# Patient Record
Sex: MALE | Race: ASIAN | NOT HISPANIC OR LATINO
[De-identification: names, ages, dates, MRNs, and addresses within clinical notes are randomized per-mention and may not be internally consistent; named-entity substitution may affect disease eponyms.]

---

## 2023-05-05 ENCOUNTER — APPOINTMENT (OUTPATIENT)
Dept: UROLOGY | Facility: CLINIC | Age: 73
End: 2023-05-05
Payer: MEDICARE

## 2023-05-05 VITALS
TEMPERATURE: 98.2 F | HEART RATE: 78 BPM | DIASTOLIC BLOOD PRESSURE: 74 MMHG | OXYGEN SATURATION: 96 % | HEIGHT: 65 IN | SYSTOLIC BLOOD PRESSURE: 149 MMHG | BODY MASS INDEX: 21.16 KG/M2 | WEIGHT: 127 LBS | RESPIRATION RATE: 18 BRPM

## 2023-05-05 DIAGNOSIS — R36.1 HEMATOSPERMIA: ICD-10-CM

## 2023-05-05 DIAGNOSIS — Z00.00 ENCOUNTER FOR GENERAL ADULT MEDICAL EXAMINATION W/OUT ABNORMAL FINDINGS: ICD-10-CM

## 2023-05-05 DIAGNOSIS — Z78.9 OTHER SPECIFIED HEALTH STATUS: ICD-10-CM

## 2023-05-05 PROCEDURE — 99204 OFFICE O/P NEW MOD 45 MIN: CPT

## 2023-05-06 DIAGNOSIS — R35.1 NOCTURIA: ICD-10-CM

## 2023-05-06 LAB
ANION GAP SERPL CALC-SCNC: 10 MMOL/L
BUN SERPL-MCNC: 20 MG/DL
CALCIUM SERPL-MCNC: 10 MG/DL
CHLORIDE SERPL-SCNC: 104 MMOL/L
CO2 SERPL-SCNC: 28 MMOL/L
CREAT SERPL-MCNC: 1 MG/DL
EGFR: 80 ML/MIN/1.73M2
GLUCOSE SERPL-MCNC: 139 MG/DL
POTASSIUM SERPL-SCNC: 4.5 MMOL/L
PSA FREE FLD-MCNC: 20 %
PSA FREE SERPL-MCNC: 0.45 NG/ML
PSA SERPL-MCNC: 2.22 NG/ML
SODIUM SERPL-SCNC: 142 MMOL/L

## 2023-05-06 NOTE — LETTER BODY
[FreeTextEntry1] : Trace Dillon MD\par [address],\[place] [address]\par [phone]\par \par Dear Dr. Mascorro,\par \par Reason for Visit: BPH.\par \par This is a 73 year-old Cantonese speaking gentleman with symptoms of BPH. Patient is here today for evaluation. He was last seen by me 12 years ago. Patient reports he has frequency and incomplete bladder emptying. He denies any hematuria or urinary incontinence. His symptoms are aggravated by hydration. He denies any alleviating factors. He has not tried any medical therapy previously. He reports no pain. His PSA is 1.9. All other review of systems are negative. He has no cancer in his family medical history. He has no previous surgical history. Past medical history, family history and social history were inquired and were noncontributory to current condition. The patient does not use tobacco or drink alcohol. Medications and allergies were reviewed. He has no known allergies to medication.\par \par On examination, the patient is a healthy-appearing gentleman in no acute distress. He is alert and oriented follows commands. He has normal mood and affect. He is normocephalic. Neck is supple. Oral no thrush Respirations are unlabored. His abdomen is soft and nontender. Bladder is nonpalpable. No CVA tenderness. Neurologically he is grossly intact. No peripheral edema. Skin without gross abnormality. He has normal male external genitalia. Normal meatus. Bilateral testes are descended intrascrotally and normal to palpation. On rectal examination, there is normal sphincter tone. The prostate is clinically benign without focal induration or nodularity.\par \par ASSESSMENT: BPH.\par \par I counseled the patient on the various etiology of his symptoms. I discussed the natural history of BPH and the treatment options available. I discussed the options of conservative management with fluid in dietary restrictions, herbal therapy, medical therapy, and minimally invasive procedures. Risk and benefits were discussed. I answered his questions. I recommended he try Uroxatral. I discussed the potential side effects of the medication. I counseled the patient on its use and side effects. If the patient develops any side effects, the patient will discontinue the medication and contact me. I recommended he obtain PSA and BMP to establish baselines. Risks and alternatives were discussed. I answered the patient questions. The patient will follow-up as directed and will contact me with any questions or concerns. Thank you for the opportunity to participate in the care of Mr. PALM. I will keep you updated on his progress.\par \par Plan: Trial of Uroxatral. PSA. BMP. Follow up in 1 month.

## 2023-05-06 NOTE — ADDENDUM
[FreeTextEntry1] : Entered by AB VAUGHAN, acting as scribe for Dr. Markel Murphy.\par The documentation recorded by the scribe accurately reflects the service I personally performed and the decisions made by me.

## 2023-05-07 LAB
APPEARANCE: CLEAR
BACTERIA: NEGATIVE /HPF
BILIRUBIN URINE: NEGATIVE
BLOOD URINE: NEGATIVE
CALCIUM OXALATE CRYSTALS: PRESENT
CAST: 0 /LPF
COLOR: NORMAL
EPITHELIAL CELLS: 0 /HPF
GLUCOSE QUALITATIVE U: NEGATIVE MG/DL
KETONES URINE: NEGATIVE MG/DL
LEUKOCYTE ESTERASE URINE: NEGATIVE
MICROSCOPIC-UA: NORMAL
NITRITE URINE: NEGATIVE
PH URINE: 6
PROTEIN URINE: NEGATIVE MG/DL
RED BLOOD CELLS URINE: 2 /HPF
REVIEW: NORMAL
SPECIFIC GRAVITY URINE: 1.03
UROBILINOGEN URINE: 1 MG/DL
WHITE BLOOD CELLS URINE: 0 /HPF

## 2023-06-09 ENCOUNTER — APPOINTMENT (OUTPATIENT)
Dept: UROLOGY | Facility: CLINIC | Age: 73
End: 2023-06-09
Payer: MEDICARE

## 2023-06-09 VITALS
RESPIRATION RATE: 18 BRPM | SYSTOLIC BLOOD PRESSURE: 150 MMHG | HEART RATE: 68 BPM | DIASTOLIC BLOOD PRESSURE: 83 MMHG | BODY MASS INDEX: 21.3 KG/M2 | OXYGEN SATURATION: 97 % | TEMPERATURE: 97.8 F | WEIGHT: 128 LBS

## 2023-06-09 PROCEDURE — 99213 OFFICE O/P EST LOW 20 MIN: CPT

## 2023-06-09 NOTE — LETTER BODY
[FreeTextEntry1] : Trace Dillon MD\par [address],\[place] [address]\par [phone]\par \par Dear Dr. Mascorro,\par \par Reason for Visit: BPH.\par \par This is a 73 year-old Cantonese speaking gentleman with symptoms of BPH. Patient is here today for follow up. Since he was last seen, he reports taking Uroxatral regularly without any difficulties or side effects. Patient reports improvement of symptoms on medical therapy. He denies any hematuria or urinary incontinence. His symptoms are aggravated by hydration. He denies any alleviating factors. He has not tried any medical therapy previously. He reports no pain. His PSA is 1.9. All other review of systems are negative. He has no cancer in his family medical history. He has no previous surgical history. Past medical history, family history and social history were inquired and were noncontributory to current condition. The patient does not use tobacco or drink alcohol. Medications and allergies were reviewed. He has no known allergies to medication.\par \par On examination, the patient is a healthy-appearing gentleman in no acute distress. He is alert and oriented follows commands. He has normal mood and affect. He is normocephalic. Neck is supple. Oral no thrush Respirations are unlabored. His abdomen is soft and nontender. Bladder is nonpalpable. No CVA tenderness. Neurologically he is grossly intact. No peripheral edema. Skin without gross abnormality. He has normal male external genitalia. Normal meatus. Bilateral testes are descended intrascrotally and normal to palpation. On rectal examination, there is normal sphincter tone. The prostate is clinically benign without focal induration or nodularity.\par \par His BMP in May 2023 demonstrated normal renal functions, creatinine 1.00. His PSA was 2.22, which is within normal limits. His urinalysis was unremarkable. His urine culture was negative. \par \par ASSESSMENT: BPH.\par \par I counseled the patient. He reports improvement of symptoms with Uroxatral. I renewed the patient's prescription for Uroxatral today. I encouraged the patient to continue medications regularly as directed. Patient understands that if he develops gross hematuria or any urinary discomfort, he will contact me for further evaluation. Risks and alternatives were discussed. I answered the patient questions. The patient will follow-up as directed and will contact me with any questions or concerns. Thank you for the opportunity to participate in the care of Mr. PALM. I will keep you updated on his progress.\par \par Plan: Continue Uroxatral. PSA. BMP. Follow up in 1 year.

## 2024-06-07 ENCOUNTER — APPOINTMENT (OUTPATIENT)
Dept: UROLOGY | Facility: CLINIC | Age: 74
End: 2024-06-07
Payer: MEDICARE

## 2024-06-07 VITALS
WEIGHT: 122 LBS | BODY MASS INDEX: 20.3 KG/M2 | RESPIRATION RATE: 16 BRPM | SYSTOLIC BLOOD PRESSURE: 126 MMHG | DIASTOLIC BLOOD PRESSURE: 79 MMHG | OXYGEN SATURATION: 97 % | HEART RATE: 63 BPM

## 2024-06-07 DIAGNOSIS — N40.1 BENIGN PROSTATIC HYPERPLASIA WITH LOWER URINARY TRACT SYMPMS: ICD-10-CM

## 2024-06-07 DIAGNOSIS — N13.8 BENIGN PROSTATIC HYPERPLASIA WITH LOWER URINARY TRACT SYMPMS: ICD-10-CM

## 2024-06-07 DIAGNOSIS — N39.41 URGE INCONTINENCE: ICD-10-CM

## 2024-06-07 PROCEDURE — G2211 COMPLEX E/M VISIT ADD ON: CPT

## 2024-06-07 PROCEDURE — 99214 OFFICE O/P EST MOD 30 MIN: CPT

## 2024-06-07 RX ORDER — ALFUZOSIN HYDROCHLORIDE 10 MG/1
10 TABLET, EXTENDED RELEASE ORAL
Qty: 90 | Refills: 3 | Status: ACTIVE | COMMUNITY
Start: 2023-05-05 | End: 1900-01-01

## 2024-06-07 RX ORDER — OXYBUTYNIN CHLORIDE 5 MG/1
5 TABLET, EXTENDED RELEASE ORAL
Qty: 30 | Refills: 3 | Status: ACTIVE | COMMUNITY
Start: 2024-06-07 | End: 1900-01-01

## 2024-06-08 LAB
ANION GAP SERPL CALC-SCNC: 12 MMOL/L
BUN SERPL-MCNC: 21 MG/DL
CALCIUM SERPL-MCNC: 9.7 MG/DL
CHLORIDE SERPL-SCNC: 102 MMOL/L
CO2 SERPL-SCNC: 25 MMOL/L
CREAT SERPL-MCNC: 0.93 MG/DL
EGFR: 87 ML/MIN/1.73M2
GLUCOSE SERPL-MCNC: 107 MG/DL
POTASSIUM SERPL-SCNC: 4.2 MMOL/L
PSA FREE FLD-MCNC: 18 %
PSA FREE SERPL-MCNC: 0.32 NG/ML
PSA SERPL-MCNC: 1.76 NG/ML
SODIUM SERPL-SCNC: 139 MMOL/L

## 2024-06-08 NOTE — LETTER BODY
[FreeTextEntry1] : Trace Dillon -[address] [place] [address] [phone]    Dear Dr. Mascorro,    Reason for Visit: BPH. Frequent urination    This is a 74-year-old Cantonese speaking gentleman with symptoms of BPH. Patient is here today for follow up. Since he was last seen, he reports taking Uroxatral regularly without any difficulties or side effects. Patient reports strong urine flow on medical therapy. However, he reports persistent urinary frequency despite medical therapy. He denies any hematuria or urinary incontinence. His symptoms are aggravated by hydration. He denies any alleviating factors. He has not tried any medical therapy previously. He reports no pain. His PSA is 2.22. All other review of systems are negative. He has no cancer in his family medical history. He has no previous surgical history. Past medical history, family history and social history were inquired and were noncontributory to current condition. The patient does not use tobacco or drink alcohol. Medications and allergies were reviewed. He has no known allergies to medication.    On examination, the patient is a healthy-appearing gentleman in no acute distress. He is alert and oriented follows commands. He has normal mood and affect. He is normocephalic. Neck is supple. Oral no thrush Respirations are unlabored. His abdomen is soft and nontender. Bladder is nonpalpable. No CVA tenderness. Neurologically he is grossly intact. No peripheral edema. Skin without gross abnormality.   His BMP in May 2023 demonstrated normal renal functions, creatinine 1.00. His PSA was 2.22, which is within normal limits. His urinalysis was unremarkable. His urine culture was negative.   ASSESSMENT: BPH. Frequent urination, persistent symptoms despite medicaiton.   I counseled the patient. In terms of his BPH, he reported strong urine flow with Uroxatral. In terms of his frequent urination, he reported persistent symptoms despite medical therapy. I recommended the patient start a trial of Oxybutynin. I discussed the potential side effects of the medication. I counseled the patient on its use and side effects. If the patient develops any side effects, the patient will discontinue medication and contact me. I renewed the patient's prescription for Uroxatral and Oxybutynin today. I encouraged the patient to continue medications regularly as directed. Patient understands that if he develops gross hematuria or any urinary discomfort, he will contact me for further evaluation. I recommended the patient repeat PSA and BMP to establish baseline. Risks and alternatives were discussed. I answered the patient questions. The patient will follow-up as directed and will contact me with any questions or concerns. Thank you for the opportunity to participate in the care of Mr. PALM. I will keep you updated on his progress.  Patient will continue longitudinal care for his complex and serious chronic condition.   Plan: Continue Uroxatral. Trial of Oxybutynin. PSA. BMP. Urinalysis. Follow up in 1 month.

## 2024-06-08 NOTE — ADDENDUM
[FreeTextEntry1] : Entered by Janes Everett, acting as scribe for Dr. Markel Murphy. The documentation recorded by the scribe accurately reflects the service I personally performed and the decisions made by me.

## 2024-06-09 LAB
APPEARANCE: CLEAR
BACTERIA: NEGATIVE /HPF
BILIRUBIN URINE: NEGATIVE
BLOOD URINE: NEGATIVE
CAST: 0 /LPF
COLOR: YELLOW
EPITHELIAL CELLS: 0 /HPF
GLUCOSE QUALITATIVE U: NEGATIVE MG/DL
KETONES URINE: NEGATIVE MG/DL
LEUKOCYTE ESTERASE URINE: NEGATIVE
MICROSCOPIC-UA: NORMAL
NITRITE URINE: NEGATIVE
PH URINE: 6
PROTEIN URINE: NEGATIVE MG/DL
RED BLOOD CELLS URINE: 0 /HPF
SPECIFIC GRAVITY URINE: 1.02
UROBILINOGEN URINE: 0.2 MG/DL
WHITE BLOOD CELLS URINE: 0 /HPF

## 2024-07-25 ENCOUNTER — NON-APPOINTMENT (OUTPATIENT)
Age: 74
End: 2024-07-25

## 2024-07-26 ENCOUNTER — APPOINTMENT (OUTPATIENT)
Dept: UROLOGY | Facility: CLINIC | Age: 74
End: 2024-07-26
Payer: MEDICARE

## 2024-07-26 VITALS
TEMPERATURE: 97.9 F | BODY MASS INDEX: 20.63 KG/M2 | WEIGHT: 124 LBS | DIASTOLIC BLOOD PRESSURE: 82 MMHG | HEART RATE: 70 BPM | SYSTOLIC BLOOD PRESSURE: 143 MMHG | RESPIRATION RATE: 18 BRPM | OXYGEN SATURATION: 94 %

## 2024-07-26 PROCEDURE — G2211 COMPLEX E/M VISIT ADD ON: CPT

## 2024-07-26 PROCEDURE — 99214 OFFICE O/P EST MOD 30 MIN: CPT

## 2024-07-27 NOTE — LETTER BODY
[FreeTextEntry1] : Trace Dillon [address] [id number] [phone]    Dear Dr. Mascorro,    Reason for Visit: BPH. Frequent urination    This is a 74-year-old Cantonese speaking gentleman with symptoms of BPH. Patient is here today for follow up. Since he was last seen, he reports taking Uroxatral and Oxybutynin regularly without any difficulties or side effects. Patient reports strong urine flow and improved urinary frequency with medications. He denies any hematuria or urinary incontinence. His symptoms are aggravated by hydration. He denies any alleviating factors. He has not tried any medical therapy previously. He reports no pain. His PSA is 2.22. All other review of systems are negative. He has no cancer in his family medical history. He has no previous surgical history. Past medical history, family history and social history were inquired and were noncontributory to current condition. The patient does not use tobacco or drink alcohol. Medications and allergies were reviewed. He has no known allergies to medication.    On examination, the patient is a healthy-appearing gentleman in no acute distress. He is alert and oriented follows commands. He has normal mood and affect. He is normocephalic. Neck is supple. Oral no thrush Respirations are unlabored. His abdomen is soft and nontender. Bladder is nonpalpable. No CVA tenderness. Neurologically he is grossly intact. No peripheral edema. Skin without gross abnormality.  His BMP in June 2024 demonstrated normal renal functions, creatinine 0.93. His PSA was 1.76, which is within normal limits. His urinalysis was unremarkable.   ASSESSMENT: BPH, strong flow with Uroxatral. Urinary frequency, improved with Oxybutynin.   I counseled the patient. His PSA is normal.  In terms of his BPH, he reported strong urine flow with Uroxatral. I recommended he continue taking Uroxatral. In terms of his urinary frequency, the patient reported improved symptoms on Oxybutynin. I recommended the patient continue taking Oxybutynin.  I renewed the patient's prescription for Uroxatral and Oxybutynin today. I encouraged the patient to continue medications regularly as directed. Risks and alternatives were discussed. I answered the patient questions. The patient will follow-up as directed and will contact me with any questions or concerns. Thank you for the opportunity to participate in the care of Mr. PALM. I will keep you updated on his progress.  Patient will continue longitudinal care for his complex and serious chronic condition.  Plan: Continue Uroxatral and Oxybutynin. Follow up in 1 year.  I spent 30-minutes time today on all issues related to this patient on today date of service including non face to face time.

## 2024-07-27 NOTE — LETTER BODY
[FreeTextEntry1] : Trace Dillon -[address] [id number] [phone]    Dear Dr. Mascorro,    Reason for Visit: BPH. Frequent urination    This is a 74-year-old Cantonese speaking gentleman with symptoms of BPH. Patient is here today for follow up. Since he was last seen, he reports taking Uroxatral and Oxybutynin regularly without any difficulties or side effects. Patient reports strong urine flow and improved urinary frequency with medications. He denies any hematuria or urinary incontinence. His symptoms are aggravated by hydration. He denies any alleviating factors. He has not tried any medical therapy previously. He reports no pain. His PSA is 2.22. All other review of systems are negative. He has no cancer in his family medical history. He has no previous surgical history. Past medical history, family history and social history were inquired and were noncontributory to current condition. The patient does not use tobacco or drink alcohol. Medications and allergies were reviewed. He has no known allergies to medication.    On examination, the patient is a healthy-appearing gentleman in no acute distress. He is alert and oriented follows commands. He has normal mood and affect. He is normocephalic. Neck is supple. Oral no thrush Respirations are unlabored. His abdomen is soft and nontender. Bladder is nonpalpable. No CVA tenderness. Neurologically he is grossly intact. No peripheral edema. Skin without gross abnormality.  His BMP in June 2024 demonstrated normal renal functions, creatinine 0.93. His PSA was 1.76, which is within normal limits. His urinalysis was unremarkable.   ASSESSMENT: BPH, strong flow with Uroxatral. Urinary frequency, improved with Oxybutynin.   I counseled the patient. His PSA is normal.  In terms of his BPH, he reported strong urine flow with Uroxatral. I recommended he continue taking Uroxatral. In terms of his urinary frequency, the patient reported improved symptoms on Oxybutynin. I recommended the patient continue taking Oxybutynin.  I renewed the patient's prescription for Uroxatral and Oxybutynin today. I encouraged the patient to continue medications regularly as directed. Risks and alternatives were discussed. I answered the patient questions. The patient will follow-up as directed and will contact me with any questions or concerns. Thank you for the opportunity to participate in the care of Mr. PALM. I will keep you updated on his progress.  Patient will continue longitudinal care for his complex and serious chronic condition.  Plan: Continue Uroxatral and Oxybutynin. Follow up in 1 year.  I spent 30-minutes time today on all issues related to this patient on today date of service including non face to face time.

## 2025-08-08 ENCOUNTER — APPOINTMENT (OUTPATIENT)
Dept: UROLOGY | Facility: CLINIC | Age: 75
End: 2025-08-08
Payer: MEDICARE

## 2025-08-08 VITALS
BODY MASS INDEX: 20.63 KG/M2 | WEIGHT: 124 LBS | SYSTOLIC BLOOD PRESSURE: 131 MMHG | TEMPERATURE: 97.4 F | OXYGEN SATURATION: 95 % | DIASTOLIC BLOOD PRESSURE: 72 MMHG | HEART RATE: 87 BPM | RESPIRATION RATE: 18 BRPM

## 2025-08-08 DIAGNOSIS — R35.1 NOCTURIA: ICD-10-CM

## 2025-08-08 DIAGNOSIS — N40.1 BENIGN PROSTATIC HYPERPLASIA WITH LOWER URINARY TRACT SYMPMS: ICD-10-CM

## 2025-08-08 DIAGNOSIS — N13.8 BENIGN PROSTATIC HYPERPLASIA WITH LOWER URINARY TRACT SYMPMS: ICD-10-CM

## 2025-08-08 PROCEDURE — G2211 COMPLEX E/M VISIT ADD ON: CPT

## 2025-08-08 PROCEDURE — 51798 US URINE CAPACITY MEASURE: CPT

## 2025-08-08 PROCEDURE — 99214 OFFICE O/P EST MOD 30 MIN: CPT

## 2025-08-09 LAB
ANION GAP SERPL CALC-SCNC: 13 MMOL/L
BUN SERPL-MCNC: 18 MG/DL
CALCIUM SERPL-MCNC: 10 MG/DL
CHLORIDE SERPL-SCNC: 99 MMOL/L
CO2 SERPL-SCNC: 26 MMOL/L
CREAT SERPL-MCNC: 0.72 MG/DL
EGFRCR SERPLBLD CKD-EPI 2021: 96 ML/MIN/1.73M2
GLUCOSE SERPL-MCNC: 84 MG/DL
POTASSIUM SERPL-SCNC: 4.5 MMOL/L
PSA FREE FLD-MCNC: 10 %
PSA FREE SERPL-MCNC: 0.33 NG/ML
PSA SERPL-MCNC: 3.27 NG/ML
SODIUM SERPL-SCNC: 138 MMOL/L